# Patient Record
Sex: MALE | Race: WHITE | Employment: FULL TIME | URBAN - METROPOLITAN AREA
[De-identification: names, ages, dates, MRNs, and addresses within clinical notes are randomized per-mention and may not be internally consistent; named-entity substitution may affect disease eponyms.]

---

## 2017-03-29 ENCOUNTER — HOSPITAL ENCOUNTER (OUTPATIENT)
Dept: PHYSICAL THERAPY | Age: 67
Discharge: HOME | End: 2017-03-29
Payer: COMMERCIAL

## 2017-03-29 PROCEDURE — 97110 THERAPEUTIC EXERCISES: CPT

## 2017-03-29 PROCEDURE — 97162 PT EVAL MOD COMPLEX 30 MIN: CPT

## 2017-03-29 PROCEDURE — 97140 MANUAL THERAPY 1/> REGIONS: CPT

## 2017-03-29 NOTE — PROGRESS NOTES
[facility] PHYSICAL THERAPY  [address] [place] [address] 201,[facility], 70 [facility] - Phone: [phone]  Fax: [phone] / [phone] [facility]  Patient Name: Chela Sánchez : 1950   Treatment   Diagnosis: (B) TKR Medical   Diagnosis: Bilateral knee pain [M25.561, M25.562]   Onset Date: 17     Referral Source: DANNY [facility]hen[facility]): 3/29/2017   Prior Hospitalization: See medical history Provider #: [id number]   Prior Level of Function: Limited squatting, stooping, walking due to (B) knee OA   Comorbidities: Hx of (R) pelvic fracture, (B) TKR   Medications: Verified on Patient Summary List   The Plan of Care and following information is based on the information from the initial evaluation.   ==================================================================================  Assessment / key information: Pt is a 62 yo male s/p (B) TKR 17 . He presents with pain ranging from 0-6/10, located (B) knee (R) > (L). Pain is made worse with activity, prolonged walking, knee flexion, better with rest, heat. Palpation reveals TTP increase to (R) suprapatellar region, (R) medial joint line. Gait: antalgic with decreased WBing on (R). Knee (R)/(L) AROM/PROM: flexion 95 deg/100, extension 0 deg. Ankle AROM DF: 0 deg. LE MMT: hip flex 4/5, abd 3/5, add 4/5, ext 3/5, knee flex 4/5, ext 4/5, ankle 4/5. Sensation is hypersensitivity to (R) incision to light touch in the LE. Patellar mobility is decrease medial and inferior patellar glides on (R). HS 90/90 20 deg. Pt is limited in the following activities: limited prolonged ambulation, stair climbing, stooping, performing work related duties. Pt will benefit from PT interventions to address the aforementioned deficits and allow pt to return to PLOF.    Eval Complexity: History MEDIUM  Complexity : 1-2 comorbidities / personal factors will impact the outcome/ POC ;  Examination  MEDIUM Complexity : 3 Standardized tests and measures addressing body structure, function, activity limitation and / or participation in recreation ; Presentation MEDIUM Complexity : Evolving with changing characteristics ; Decision Making MEDIUM Complexity : FOTO score of 26-74; Overall Complexity MEDIUM   ==================================================================================  Problem List: pain affecting function, decrease ROM, decrease strength, decrease ADL/ functional abilitiies, decrease activity tolerance, decrease flexibility/ joint mobility and decrease transfer abilities   Treatment Plan may include any combination of the following: Therapeutic exercise, Therapeutic activities, Neuromuscular re-education, Physical agent/modality, Gait/balance training, Manual therapy and Patient education  Patient / Family readiness to learn indicated by: asking questions, trying to perform skills and interest  Persons(s) to be included in education: patient (P)  Barriers to Learning/Limitations: no  Measures taken:    Patient Goal (s): Decrease pain and increase function   Patient self reported health status: good  Rehabilitation Potential: good   Short Term Goals: To be accomplished in  2  weeks:  1. Pt will be independent and compliant with HEP to decrease pain, increase ROM and return pt to PLOF. 2. Pt will note pain less than or equal to 5/10 at worst to allow increase in functional abilities. 3. Pt demonstrates increase in (B) knee flexion to > 110 deg to allow increase in functional activity tolerance    Long Term Goals: To be accomplished in  4  weeks:  1. Increase score on FOTO by > or = to 14 points to demo an increase in functional activity tolerance with the LE. 2. Pt will note < or = 2/10 pain with all mobility to improve comfort with ADLs.    3. Pt will demonstrates GROC >/= 4+ to allow increase in functional activity tolerance   Frequency / Duration: Patient to be seen  2-3  times per week for 4  weeks:  Patient / Caregiver education and instruction: self care, activity modification and exercises  G-Codes (GP): edison  Therapist Signature: Kathie Mariee DPT, CIMT Date: 0/32/6556   Certification Period: na Time: 4:27 PM   ===========================================================================================  I certify that the above Physical Therapy Services are being furnished while the patient is under my care. I agree with the treatment plan and certify that this therapy is necessary. Physician Signature:        Date:       Time:     Please sign and return to [facility] or you may fax the signed copy to [phone]. Thank you.

## 2017-03-29 NOTE — PROGRESS NOTES
PHYSICAL THERAPY - DAILY TREATMENT NOTE    Patient Name: Robert Navarro        Date: 3/29/2017  : 1950   YES Patient  Verified  Visit #:      12  Insurance: Payor: /      In time: 400 Out time: 430   Total Treatment Time: 30     TREATMENT AREA =  (B) TKR    SUBJECTIVE  Pain Level (on 0 to 10 scale):  ie  / 10   Medication Changes/New allergies or changes in medical history, any new surgeries or procedures? NO    If yes, update Summary List   Subjective Functional Status/Changes:  []  No changes reported     See POC          OBJECTIVE  Modality rationale:      min [] Estim, type:                                          []  att     []  unatt     []  w/US     []  w/ice    []  w/heat    min []  Mechanical Traction: type/lbs                                               []  pro   []  sup   []  int   []  cont    min []  Ultrasound, settings/location:      min []  Iontophoresis:  []  take home patch w/ dexamethazone    min                                []  in clinic w/ dexamethazone    min []  Ice     []  Heat     position:     min []  Other:      8 min Manual Therapy: Inferior glide to (R) patella, posterior glide for (R) knee flexion       min Therapeutic Exercise:  [x]  See flow sheet   []  Other:      []  Added:     to improve (function):    []  Changed:     to improve (function):       min Patient Education:  YES  Reviewed HEP   []  Progressed/Changed HEP based on:         Other Objective/Functional Measures:    See POC     Post Treatment Pain Level (on 0 to 10) scale:   ie  / 10     ASSESSMENT  []  See Progress Note/Recertification   Patient will continue to benefit from skilled therapy to address remaining functional deficits: Decrease amb tolerance, work related postures    Progress toward goals / Updated goals:    See POC     PLAN  []  Upgrade activities as tolerated YES Continue plan of care   []  Discharge due to :    []  Other:      Therapist: Aida Wells DPT, CIMT    Date: 3/29/2017 Time: 4:25 PM

## 2017-03-30 ENCOUNTER — HOSPITAL ENCOUNTER (OUTPATIENT)
Dept: PHYSICAL THERAPY | Age: 67
Discharge: HOME | End: 2017-03-30
Payer: COMMERCIAL

## 2017-03-30 PROCEDURE — 97112 NEUROMUSCULAR REEDUCATION: CPT

## 2017-03-30 PROCEDURE — 97140 MANUAL THERAPY 1/> REGIONS: CPT

## 2017-03-30 PROCEDURE — 97110 THERAPEUTIC EXERCISES: CPT

## 2017-03-30 NOTE — PROGRESS NOTES
PHYSICAL THERAPY - DAILY TREATMENT NOTE    Patient Name: Flavio Oliveros        Date: 3/30/2017  : 1950   YES Patient  Verified  Visit #:     Insurance: Payor: Adi Rainey / Plan: [place] [place] PPO / Product Type: PPO /      In time: 800 Out time: 900   Total Treatment Time: 60       TREATMENT AREA = Bilateral knee pain [M25.561, M25.562]    SUBJECTIVE  Pain Level (on 0 to 10 scale):  3  / 10   Medication Changes/New allergies or changes in medical history, any new surgeries or procedures?     NO    If yes, update Summary List   Subjective Functional Status/Changes:  []  No changes reported     Reports soreness anterior aspect of (R) knee at onset of visit today          OBJECTIVE  Modalities Rationale:     decrease inflammation and decrease pain to improve patient's ability to perform ADLs   min [] Estim, type/location:                                      []  att     []  unatt     []  w/US     []  w/ice    []  w/heat    min []  Mechanical Traction: type/lbs                   []  pro   []  sup   []  int   []  cont    []  before manual    []  after manual    min []  Ultrasound, settings/location:      min []  Iontophoresis w/ dexamethasone, location:                                               []  take home patch       []  in clinic   10 min [x]  Ice     []  Heat    location/position: (B) knee    min []  Vasopneumatic Device, press/temp:     min []  Other:    [x] Skin assessment post-treatment (if applicable):    [x]  intact    []  redness- no adverse reaction     []redness  adverse reaction:        32 min Therapeutic Exercise:  [x]  See flow sheet   Rationale:      increase ROM and increase strength to improve the patients ability to perform ADLs     10 min Manual Therapy: DTM to (B) suprapatellar region, pat mobs, TF mobs, PROM of knee    Rationale:      decrease pain, increase ROM and increase tissue extensibility to improve patient's ability to perform ADLs    8 min Neuromuscular Re-ed: SL balance, sumanth   Rationale:    improve coordination, improve balance and increase proprioception to improve the patients ability to increase amb tolerance      min Gait Training:    Rationale:       min Patient Education:  YES  Reviewed HEP   []  Progressed/Changed HEP based on: Other Objective/Functional Measures:  Initiated therx per flow sheet, con't with limited knee ROM of (B) LEs, increases soreness with palpation of suprapatellar region, decreased posterior glide of tibia     Post Treatment Pain Level (on 0 to 10) scale:   2  / 10     ASSESSMENT  Assessment/Changes in Function:     Demonstrates good tolerance to initial therx without increase in s/s     []  See Progress Note/Recertification   Patient will continue to benefit from skilled PT services to modify and progress therapeutic interventions, address functional mobility deficits, address ROM deficits, address strength deficits and analyze and address soft tissue restrictions to attain remaining goals.    Progress toward goals / Updated goals:  No changes towards goals, will monitor and progress as able      PLAN  []  Upgrade activities as tolerated YES Continue plan of care   []  Discharge due to :    []  Other:      Therapist: Álvaro Byrnes DPT, CIMT    Date: 3/30/2017 Time: 8:13 AM       Future Appointments  Date Time Provider Hansa Brooks   4/5/2017 8:00 AM Ave Wade, PT [facility]   4/7/2017 6:30 AM Mary Kay Parada, PT [facility]   4/10/2017 7:30 AM Mary Kay Parada, PT [facility]   4/12/2017 6:30 AM Mary Kay Parada, PT [facility]   4/14/2017 8:00 AM Stanislaw Astudillo, PT [facility]   4/17/2017 8:00 AM Ave Wade, PT [address] [place] [address]   4/19/2017 8:00 AM Ave Wade, PT [address] [place] [address]   4/21/2017 8:00 AM Stanislaw Astudillo, PT [facility]   4/24/2017 8:00 AM Ave Wade, PT [facility]   4/26/2017 8:00 AM Ave Wade, PT [facility]   4/28/2017 8:00 AM Stanislaw Astudillo, PT [facility]

## 2017-04-05 ENCOUNTER — HOSPITAL ENCOUNTER (OUTPATIENT)
Dept: PHYSICAL THERAPY | Age: 67
Discharge: HOME | End: 2017-04-05
Payer: COMMERCIAL

## 2017-04-05 PROCEDURE — 97140 MANUAL THERAPY 1/> REGIONS: CPT | Performed by: PHYSICAL THERAPIST

## 2017-04-05 PROCEDURE — 97110 THERAPEUTIC EXERCISES: CPT | Performed by: PHYSICAL THERAPIST

## 2017-04-05 NOTE — PROGRESS NOTES
[facility]   PHYSICAL THERAPY - DAILY TREATMENT NOTE    Patient Name: Kael Daigle        Date: 2017  : 1950   yes Patient  Verified  Visit #:   3   of   12  Insurance: Payor: Darcie Heredia / Plan: Nava Cole PPO / Product Type: PPO /      In time: 8:00 Out time: 8:55   Total Treatment Time: 55     Medicare Time Tracking (below)   Total Timed Codes (min):  na 1:1 Treatment Time:  na     TREATMENT AREA =  Bilateral knee pain [M25.561, M25.562]    SUBJECTIVE  Pain Level (on 0 to 10 scale):  0  / 10   Medication Changes/New allergies or changes in medical history, any new surgeries or procedures?    no  If yes, update Summary List   Subjective Functional Status/Changes:  []  No changes reported     i am sore in the top part of my legs where the muscle is but no pain           OBJECTIVE  Modalities Rationale:     decrease inflammation, decrease pain and increase tissue extensibility to improve patient's ability to stand/walk prolonged periods   min [] Estim, type/location:                                      []  att     []  unatt     []  w/US     []  w/ice    []  w/heat    min []  Mechanical Traction: type/lbs                   []  pro   []  sup   []  int   []  cont    []  before manual    []  after manual    min []  Ultrasound, settings/location:      min []  Iontophoresis w/ dexamethasone, location:                                               []  take home patch       []  in clinic   10 min [x]  Ice     []  Heat    location/position: Supine with bolster    min []  Vasopneumatic Device, press/temp:     min []  Other:    [x] Skin assessment post-treatment (if applicable):    [x]  intact    []  redness- no adverse reaction     []redness  adverse reaction:        25 min Therapeutic Exercise:  [x]  See flow sheet   Rationale:      increase ROM, increase strength, improve balance and increase proprioception to improve the patients ability to stand/walk prolonged periods     20 min Manual Therapy: B Dtm/stretch to quad, pat sup/inf mobs, knee prom   Rationale:      decrease pain, increase ROM, increase tissue extensibility and decrease trigger points to improve patient's ability to stand/walk prolonged periods         min Patient Education:  yes  Reviewed HEP   [x]  Progressed/Changed HEP based on:  Advised to perform stm with roller on quads 2-3' daily      Other Objective/Functional Measures:    Performed therapy as per flow sheet followed by MT in both supine and prone     Post Treatment Pain Level (on 0 to 10) scale:   0  / 10     ASSESSMENT  Assessment/Changes in Function:     Continues with limited R knee flexion with palpable trp and decreased extensibility of quad. []  See Progress Note/Recertification   Patient will continue to benefit from skilled PT services to modify and progress therapeutic interventions, address functional mobility deficits, address ROM deficits, address strength deficits, analyze and address soft tissue restrictions, analyze and cue movement patterns, analyze and modify body mechanics/ergonomics, assess and modify postural abnormalities, address imbalance/dizziness and instruct in home and community integration to attain remaining goals.    Progress toward goals / Updated goals:  Stg #3 partially achieved - R arom still <110     PLAN  []  Upgrade activities as tolerated yes Continue plan of care   []  Discharge due to :    []  Other:      Therapist: Zoraida Stout, PT    Date: 4/5/2017 Time: 8:46 AM     Future Appointments  Date Time Provider Hansa Brooks   4/7/2017 6:30 AM Cori Burgess, PT [facility]   4/10/2017 7:30 AM Cori Burgess, PT [facility]   4/12/2017 6:30 AM Cori Burgess, PT [facility]   4/14/2017 8:00 AM Shaunna Goncalves, PT [facility]   4/17/2017 8:00 AM Zoraida Stout, PT [facility]   4/19/2017 8:00 AM Zoraida Stout, PT [facility]   4/21/2017 8:00 AM Shaunna Goncalves, PT [facility]   4/24/2017 8:00 AM Zoraida Stout, PT [facility]   4/26/2017 8:00 AM Zoraida Stout, PT [facility]   4/28/2017 8:00 AM GARY Mesa [facility]

## 2017-04-07 ENCOUNTER — HOSPITAL ENCOUNTER (OUTPATIENT)
Dept: PHYSICAL THERAPY | Age: 67
Discharge: HOME | End: 2017-04-07
Payer: COMMERCIAL

## 2017-04-07 PROCEDURE — 97110 THERAPEUTIC EXERCISES: CPT

## 2017-04-07 PROCEDURE — 97140 MANUAL THERAPY 1/> REGIONS: CPT

## 2017-04-07 NOTE — PROGRESS NOTES
PHYSICAL THERAPY - DAILY TREATMENT NOTE    Patient Name: Josh De Oliveira        Date: 2017  : 1950   YES Patient  Verified  Visit #:      of   12  Insurance: Payor: Prashant Angel / Plan: Yoav Jeter PPO / Product Type: PPO /      In time: 6:30 Out time: 7:35   Total Treatment Time: 65     Medicare Time Tracking (below)   Total Timed Codes (min):  na 1:1 Treatment Time:  na     TREATMENT AREA =  Bilateral knee pain [M25.561, M25.562]    SUBJECTIVE  Pain Level (on 0 to 10 scale):  0  / 10   Medication Changes/New allergies or changes in medical history, any new surgeries or procedures?     NO    If yes, update Summary List   Subjective Functional Status/Changes:  []  No changes reported     Im a little sore from the exercises, but no pain at th knee          OBJECTIVE  Modalities Rationale:     decrease inflammation and decrease pain to improve patient's ability to amb   min [] Estim, type/location:                                      []  att     []  unatt     []  w/US     []  w/ice    []  w/heat    min []  Mechanical Traction: type/lbs                   []  pro   []  sup   []  int   []  cont    []  before manual    []  after manual    min []  Ultrasound, settings/location:      min []  Iontophoresis w/ dexamethasone, location:                                               []  take home patch       []  in clinic   10 min [x]  Ice     []  Heat    location/position:     min []  Vasopneumatic Device, press/temp:     min []  Other:    [x] Skin assessment post-treatment (if applicable):    [x]  intact    []  redness- no adverse reaction     []redness  adverse reaction:        45 min Therapeutic Exercise:  [x]  See flow sheet   Rationale:      increase ROM, increase strength and improve coordination to improve the patients ability to amb     10 min Manual Therapy: DTM quad, HS, popliteus, TF/pat mob, stretch   Rationale:      decrease pain, increase ROM and increase tissue extensibility to improve patient's ability to amb       min Patient Education:  YES  Reviewed HEP   []  Progressed/Changed HEP based on: Other Objective/Functional Measures:    Sig TTP at BF on R     Post Treatment Pain Level (on 0 to 10) scale:   0  / 10     ASSESSMENT  Assessment/Changes in Function:     Good shannan to all Rx without increase in pain      []  See Progress Note/Recertification   Patient will continue to benefit from skilled PT services to modify and progress therapeutic interventions, address functional mobility deficits, address ROM deficits, address strength deficits, analyze and address soft tissue restrictions, analyze and cue movement patterns, analyze and modify body mechanics/ergonomics and assess and modify postural abnormalities to attain remaining goals.    Progress toward goals / Updated goals:    Slow progress with ROM      PLAN  []  Upgrade activities as tolerated YES Continue plan of care   []  Discharge due to :    []  Other:      Therapist: Toi Trevino, PT, OCS, SCS, CSCS    Date: 4/7/2017 Time: 6:35 AM       Future Appointments  Date Time Provider Hansa Brooks   4/10/2017 7:30 AM Namita Fernandez, PT [facility]   4/12/2017 6:30 AM Namita Fernandez, PT [facility]   4/14/2017 8:00 AM Guero Gooden, PT [facility]   4/17/2017 8:00 AM Chelsey Evans, PT [facility]   4/19/2017 8:00 AM Chelsey Evans, PT [facility]   4/21/2017 8:00 AM Guero Gooden PT [facility]   4/24/2017 8:00 AM Chelsey Evans PT [facility]   4/26/2017 8:00 AM Chelsey Evans, PT [facility]   4/28/2017 8:00 AM Guero Gooden, PT [facility]

## 2017-04-10 ENCOUNTER — HOSPITAL ENCOUNTER (OUTPATIENT)
Dept: PHYSICAL THERAPY | Age: 67
Discharge: HOME | End: 2017-04-10
Payer: COMMERCIAL

## 2017-04-10 PROCEDURE — 97140 MANUAL THERAPY 1/> REGIONS: CPT

## 2017-04-10 PROCEDURE — 97110 THERAPEUTIC EXERCISES: CPT

## 2017-04-10 NOTE — PROGRESS NOTES
PHYSICAL THERAPY - DAILY TREATMENT NOTE    Patient Name: Neno Velazquez        Date: 4/10/2017  : 1950   YES Patient  Verified  Visit #:      of   12  Insurance: Payor: Socorro Persons / Plan: [place] PPO / Product Type: PPO /      In time: 7:35 Out time: 8:30   Total Treatment Time: 55     Medicare Time Tracking (below)   Total Timed Codes (min):  na 1:1 Treatment Time:  na     TREATMENT AREA =  Bilateral knee pain [M25.561, M25.562]    SUBJECTIVE  Pain Level (on 0 to 10 scale):  0  / 10   Medication Changes/New allergies or changes in medical history, any new surgeries or procedures? NO    If yes, update Summary List   Subjective Functional Status/Changes:  []  No changes reported     No new c/o          OBJECTIVE     40 min Therapeutic Exercise: [x] See flow sheet   Rationale: increase ROM, increase strength and improve coordination to improve the patients ability to amb      15 min Manual Therapy: DTM quad, HS, popliteus, TF/pat mob, stretch   Rationale: decrease pain, increase ROM and increase tissue extensibility to improve patient's ability to amb  Rationale:       min Patient Education:  YES  Reviewed HEP   []  Progressed/Changed HEP based on: Other Objective/Functional Measures:    Cont to have sig TTP at R HS     Post Treatment Pain Level (on 0 to 10) scale:   0  / 10     ASSESSMENT  Assessment/Changes in Function:     Good shannan to all Rx without increase in pain      []  See Progress Note/Recertification   Patient will continue to benefit from skilled PT services to modify and progress therapeutic interventions, address functional mobility deficits, address ROM deficits, address strength deficits, analyze and address soft tissue restrictions, analyze and cue movement patterns, analyze and modify body mechanics/ergonomics and assess and modify postural abnormalities to attain remaining goals.    Progress toward goals / Updated goals:    Slow progress with ROM      PLAN  []  Upgrade activities as tolerated YES Continue plan of care   []  Discharge due to :    []  Other:      Therapist: Maulik Mayorga, PT, OCS, SCS, CSCS    Date: 4/10/2017 Time: 7:47 AM       Future Appointments  Date Time Provider Hansa Brooks   4/12/2017 6:30 AM Mary Kay Parada, PT [facility]   4/14/2017 8:00 AM Stanislaw Astudillo, PT [facility]   4/17/2017 8:00 AM Monica Olvera, PT [facility]   4/19/2017 8:00 AM Monica Olvera, PT [facility]   4/21/2017 8:00 AM Stanislaw Astudillo, PT [facility]   4/24/2017 8:00 AM Monica lOvera, PT [facility]   4/26/2017 8:00 AM Monica Olvera, PT [facility]   4/28/2017 8:00 AM Stanislaw Astudillo, PT [facility]

## 2017-04-12 ENCOUNTER — HOSPITAL ENCOUNTER (OUTPATIENT)
Dept: PHYSICAL THERAPY | Age: 67
Discharge: HOME | End: 2017-04-12
Payer: COMMERCIAL

## 2017-04-12 PROCEDURE — 97110 THERAPEUTIC EXERCISES: CPT

## 2017-04-12 PROCEDURE — 97140 MANUAL THERAPY 1/> REGIONS: CPT

## 2017-04-14 ENCOUNTER — HOSPITAL ENCOUNTER (OUTPATIENT)
Dept: PHYSICAL THERAPY | Age: 67
Discharge: HOME | End: 2017-04-14
Payer: COMMERCIAL

## 2017-04-14 PROCEDURE — 97140 MANUAL THERAPY 1/> REGIONS: CPT

## 2017-04-14 PROCEDURE — 97110 THERAPEUTIC EXERCISES: CPT

## 2017-04-14 NOTE — PROGRESS NOTES
PHYSICAL THERAPY - DAILY TREATMENT NOTE    Patient Name: Kwan Avina        Date: 2017  : 1950   YES Patient  Verified  Visit #:     Insurance: Payor: Hanny Pearce / Plan: Star Satnos PPO / Product Type: PPO /      In time: 800 Out time: 900   Total Treatment Time: 60       TREATMENT AREA = Bilateral knee pain [M25.561, M25.562]    SUBJECTIVE  Pain Level (on 0 to 10 scale):  1  / 10   Medication Changes/New allergies or changes in medical history, any new surgeries or procedures?     NO    If yes, update Summary List   Subjective Functional Status/Changes:  []  No changes reported     Reports soreness (R) > (L) knee with limited function          OBJECTIVE  Modalities Rationale:     decrease pain and increase tissue extensibility to improve patient's ability to perform ADLs   min [] Estim, type/location:                                      []  att     []  unatt     []  w/US     []  w/ice    []  w/heat    min []  Mechanical Traction: type/lbs                   []  pro   []  sup   []  int   []  cont    []  before manual    []  after manual    min []  Ultrasound, settings/location:      min []  Iontophoresis w/ dexamethasone, location:                                               []  take home patch       []  in clinic   10 min [x]  Ice     []  Heat    location/position: (B) knee     min []  Vasopneumatic Device, press/temp:     min []  Other:    [x] Skin assessment post-treatment (if applicable):    [x]  intact    []  redness- no adverse reaction     []redness  adverse reaction:        40 min Therapeutic Exercise:  [x]  See flow sheet   Rationale:      increase ROM and increase strength to improve the patients ability to perform ADLs     10 Min Manual Therapy: DTM to (B) suprapatellar region, inferior pat mobs, TF mobs for knee flexion, PROM of (R) knee    Rationale:      decrease pain, increase ROM, increase tissue extensibility and decrease edema  to improve patient's ability to perform ADLs min Patient Education:  YES  Reviewed HEP   []  Progressed/Changed HEP based on: Other Objective/Functional Measures:    Cont with edema in suprapatellar region with AROM flexion limited to 115 deg on (L), 97 deg on (R)      Post Treatment Pain Level (on 0 to 10) scale:   0  / 10     ASSESSMENT  Assessment/Changes in Function:     Progressing slowly with overall mobility of (B) extremities      []  See Progress Note/Recertification   Patient will continue to benefit from skilled PT services to modify and progress therapeutic interventions, address functional mobility deficits, address ROM deficits, address strength deficits and analyze and address soft tissue restrictions to attain remaining goals. Progress toward goals / Updated goals:     Will monitor and progress as able      PLAN  []  Upgrade activities as tolerated YES Continue plan of care   []  Discharge due to :    []  Other:      Therapist: Rickey Richard DPT, [facility]    Date: 4/14/2017 Time: 9:07 AM       Future Appointments  Date Time Provider Hansa Brooks   4/17/2017 8:00 AM Cassandra Petty, PT [address] [place] [address]   4/19/2017 8:00 AM Cassandra Petty, PT [address] [place] [address]   4/21/2017 8:00 AM Alycia Tilley, PT [facility]   4/24/2017 8:00 AM Cassandra Petty, PT [facility]   4/26/2017 8:00 AM Cassandra Petty, PT [facility]   4/28/2017 8:00 AM Alycia Tilley, PT [address] [place] [address]

## 2017-04-17 ENCOUNTER — HOSPITAL ENCOUNTER (OUTPATIENT)
Dept: PHYSICAL THERAPY | Age: 67
Discharge: HOME | End: 2017-04-17
Payer: COMMERCIAL

## 2017-04-17 PROCEDURE — 97140 MANUAL THERAPY 1/> REGIONS: CPT | Performed by: PHYSICAL THERAPIST

## 2017-04-17 PROCEDURE — 97110 THERAPEUTIC EXERCISES: CPT | Performed by: PHYSICAL THERAPIST

## 2017-04-17 NOTE — PROGRESS NOTES
[facility] PHYSICAL THERAPY - DAILY TREATMENT NOTE    Patient Name: Oanh Reyes        Date: 2017  : 1950   yes Patient  Verified  Visit #:     Insurance: Payor: Sofia Merida / Plan: Nancy Byrnes PPO / Product Type: PPO /      In time: 8:05 Out time: 9:15   Total Treatment Time: 65     Medicare Time Tracking (below)   Total Timed Codes (min):  na 1:1 Treatment Time:  na     TREATMENT AREA =  Bilateral knee pain [M25.561, M25.562]    SUBJECTIVE  Pain Level (on 0 to 10 scale):  0  / 10   Medication Changes/New allergies or changes in medical history, any new surgeries or procedures?    no  If yes, update Summary List   Subjective Functional Status/Changes:  []  No changes reported     i went camping over the weekend and i walked around a lot on [place] and my knees actually held up okay.           OBJECTIVE  Modalities Rationale:     decrease inflammation, decrease pain and increase tissue extensibility to improve patient's ability to perform adls   min [] Estim, type/location:                                      []  att     []  unatt     []  w/US     []  w/ice    []  w/heat    min []  Mechanical Traction: type/lbs                   []  pro   []  sup   []  int   []  cont    []  before manual    []  after manual    min []  Ultrasound, settings/location:      min []  Iontophoresis w/ dexamethasone, location:                                               []  take home patch       []  in clinic   10 min [x]  Ice     []  Heat    location/position: Supine with bolster    min []  Vasopneumatic Device, press/temp:     min []  Other:    [x] Skin assessment post-treatment (if applicable):    [x]  intact    []  redness- no adverse reaction     []redness  adverse reaction:        35 min Therapeutic Exercise:  [x]  See flow sheet   Rationale:      increase ROM, increase strength, improve coordination, improve balance and increase proprioception to improve the patients ability to complete adls     20 min Manual Therapy: B knee prom, pat mobs all directions, hs stretch and dtm   Rationale:      decrease pain, increase ROM, increase tissue extensibility and decrease trigger points to improve patient's ability to stand/walk prolonged periods     min Patient Education:  yes  Reviewed HEP   []  Progressed/Changed HEP based on: Other Objective/Functional Measures:    Despite increase in activity over the weekend arrived to clinic with no increase in edema but ttp and decreased restriction of R hs  Completed te as per flow sheet with constant cues required for mini-band te to avoid hip compensation      Post Treatment Pain Level (on 0 to 10) scale:   2  / 10     ASSESSMENT  Assessment/Changes in Function:     Continues with R sided weakness and lack of tke promoting antalgic gait      []  See Progress Note/Recertification   Patient will continue to benefit from skilled PT services to modify and progress therapeutic interventions, address functional mobility deficits, address ROM deficits, address strength deficits, analyze and address soft tissue restrictions, analyze and cue movement patterns, analyze and modify body mechanics/ergonomics, assess and modify postural abnormalities and instruct in home and community integration to attain remaining goals.    Progress toward goals / Updated goals:    Continues to make steady gains towards consistent pain reduction      PLAN  []  Upgrade activities as tolerated yes Continue plan of care   []  Discharge due to :    []  Other:      Therapist: Chantal Meza PT    Date: 4/17/2017 Time: 9:57 AM     Future Appointments  Date Time Provider Hansa Brooks   4/19/2017 8:00 AM Chantal Meza PT [address] [place] [address]   4/21/2017 8:00 AM Annette Sarmiento PT [facility]   4/24/2017 8:00 AM Chantal Meza PT [address] [place] [address]   4/26/2017 8:00 AM Chantal Meza PT [facility]   4/28/2017 8:00 AM Annette Sarmiento PT [facility]

## 2017-04-19 ENCOUNTER — HOSPITAL ENCOUNTER (OUTPATIENT)
Dept: PHYSICAL THERAPY | Age: 67
Discharge: HOME | End: 2017-04-19
Payer: COMMERCIAL

## 2017-04-19 PROCEDURE — 97110 THERAPEUTIC EXERCISES: CPT | Performed by: PHYSICAL THERAPIST

## 2017-04-19 PROCEDURE — 97140 MANUAL THERAPY 1/> REGIONS: CPT | Performed by: PHYSICAL THERAPIST

## 2017-04-19 NOTE — PROGRESS NOTES
[facility] PHYSICAL THERAPY - DAILY TREATMENT NOTE    Patient Name: Kael Daigle        Date: 2017  : 1950   yes Patient  Verified  Visit #:     Insurance: Payor: Darcie Heredia / Plan: Nava Cole PPO / Product Type: PPO /      In time: 8:00 Out time: 9:05   Total Treatment Time: 62     Medicare Time Tracking (below)   Total Timed Codes (min):  na 1:1 Treatment Time:  na     TREATMENT AREA =  Bilateral knee pain [M25.561, M25.562]    SUBJECTIVE  Pain Level (on 0 to 10 scale):  0  / 10   Medication Changes/New allergies or changes in medical history, any new surgeries or procedures?    no  If yes, update Summary List   Subjective Functional Status/Changes:  []  No changes reported     i was a little sore after last time but a good muscle sore. im pleased with the progress i have made so far.         OBJECTIVE  Modalities Rationale:     decrease inflammation, decrease pain and increase tissue extensibility to improve patient's ability to perform adls   min [] Estim, type/location:                                      []  att     []  unatt     []  w/US     []  w/ice    []  w/heat    min []  Mechanical Traction: type/lbs                   []  pro   []  sup   []  int   []  cont    []  before manual    []  after manual    min []  Ultrasound, settings/location:      min []  Iontophoresis w/ dexamethasone, location:                                               []  take home patch       []  in clinic   10 min [x]  Ice     []  Heat    location/position: Supine with bolster    min []  Vasopneumatic Device, press/temp:     min []  Other:    [x] Skin assessment post-treatment (if applicable):    [x]  intact    []  redness- no adverse reaction     []redness  adverse reaction:        35 min Therapeutic Exercise:  [x]  See flow sheet   Rationale:      increase ROM, increase strength, improve coordination, improve balance and increase proprioception to improve the patients ability to complete adls     17 min Manual Therapy: B knee prom, pat mobs all directions, hs stretch and dtm R    Rationale:      decrease pain, increase ROM, increase tissue extensibility and decrease trigger points to improve patient's ability to stand/walk prolonged periods     min Patient Education:  yes  Reviewed HEP   []  Progressed/Changed HEP based on: Other Objective/Functional Measures:    arom/prom R 0-103/111 with anterior \"tightness\" reported at end range flexion  Difficulty negotiating reciprocal hurdles on R without hip hike (even with cues)  Continues with palpable soft tissue restrictions along quad   Post Treatment Pain Level (on 0 to 10) scale:   2  / 10     ASSESSMENT  Assessment/Changes in Function:     Continues with R sided weakness and lack of tke during gait      []  See Progress Note/Recertification   Patient will continue to benefit from skilled PT services to modify and progress therapeutic interventions, address functional mobility deficits, address ROM deficits, address strength deficits, analyze and address soft tissue restrictions, analyze and cue movement patterns, analyze and modify body mechanics/ergonomics, assess and modify postural abnormalities and instruct in home and community integration to attain remaining goals.    Progress toward goals / Updated goals:    Slow progress towards stg #3     PLAN  []  Upgrade activities as tolerated yes Continue plan of care   []  Discharge due to :    []  Other:      Therapist: Joselyn Toribio, PT    Date: 4/19/2017 Time: 9:57 AM     Future Appointments  Date Time Provider Hansa Brooks   4/21/2017 8:00 AM Gilford Goring, PT [facility]   4/24/2017 8:00 AM Joselyn Toribio PT [facility]   4/26/2017 8:00 AM Joselyn Toribio PT [facility]   4/28/2017 8:00 AM Gilford Goring, PT [facility]

## 2017-04-21 ENCOUNTER — HOSPITAL ENCOUNTER (OUTPATIENT)
Dept: PHYSICAL THERAPY | Age: 67
Discharge: HOME | End: 2017-04-21
Payer: COMMERCIAL

## 2017-04-21 PROCEDURE — 97140 MANUAL THERAPY 1/> REGIONS: CPT

## 2017-04-21 PROCEDURE — 97110 THERAPEUTIC EXERCISES: CPT

## 2017-04-21 NOTE — PROGRESS NOTES
PHYSICAL THERAPY - DAILY TREATMENT NOTE    Patient Name: Vanessa Cruz        Date: 2017  : 1950   YES Patient  Verified  Visit #:   10   of   12  Insurance: Payor: Cam Ma / Plan: Jesus Jones PPO / Product Type: PPO /      In time: 800 Out time: 845   Total Treatment Time: 45       TREATMENT AREA = Bilateral knee pain [M25.561, M25.562]    SUBJECTIVE  Pain Level (on 0 to 10 scale):  0  / 10   Medication Changes/New allergies or changes in medical history, any new surgeries or procedures?     NO    If yes, update Summary List   Subjective Functional Status/Changes:  []  No changes reported     Reports con't tightness (R) knee with flexion based activities           OBJECTIVE  Modalities Rationale:     decrease inflammation and decrease pain to improve patient's ability to perform ADLs   min [] Estim, type/location:                                      []  att     []  unatt     []  w/US     []  w/ice    []  w/heat    min []  Mechanical Traction: type/lbs                   []  pro   []  sup   []  int   []  cont    []  before manual    []  after manual    min []  Ultrasound, settings/location:      min []  Iontophoresis w/ dexamethasone, location:                                               []  take home patch       []  in clinic   10 min [x]  Ice     []  Heat    location/position:     min []  Vasopneumatic Device, press/temp:     min []  Other:    [x] Skin assessment post-treatment (if applicable):    [x]  intact    []  redness- no adverse reaction     []redness  adverse reaction:        25 min Therapeutic Exercise:  [x]  See flow sheet   Rationale:      increase ROM and increase strength to improve the patients ability to perform ADLs     10 Min Manual Therapy: Pat mobs, DTM to (R) rectus femoris, PROM of (R) knee    Rationale:      decrease pain, increase ROM and increase tissue extensibility to improve patient's ability to perform ADLs     min Patient Education:  YES  Reviewed HEP   [] Progressed/Changed HEP based on: Other Objective/Functional Measures:    Demonstrates increase mm hypertonicity in (R) rectus femoris, with limited knee flexion    Post Treatment Pain Level (on 0 to 10) scale:  0   / 10     ASSESSMENT  Assessment/Changes in Function:     Progressing with overall function and ADLs tolerance      []  See Progress Note/Recertification   Patient will continue to benefit from skilled PT services to modify and progress therapeutic interventions, address functional mobility deficits, address ROM deficits, address strength deficits and analyze and address soft tissue restrictions to attain remaining goals.    Progress toward goals / Updated goals:    Progressing with LTGs - see PN      PLAN  []  Upgrade activities as tolerated YES Continue plan of care   []  Discharge due to :    []  Other:      Therapist: Tad Morel DPT, [facility]    Date: 4/21/2017 Time: 8:03 AM       Future Appointments  Date Time Provider Hansa Brooks   4/24/2017 8:00 AM Kimberly Duarte, PT [facility]   4/26/2017 8:00 AM Kimberly Duarte PT [facility]   4/28/2017 8:00 AM Selina Hoover, PT [facility]

## 2017-04-24 ENCOUNTER — HOSPITAL ENCOUNTER (OUTPATIENT)
Dept: PHYSICAL THERAPY | Age: 67
Discharge: HOME | End: 2017-04-24
Payer: COMMERCIAL

## 2017-04-24 PROCEDURE — 97014 ELECTRIC STIMULATION THERAPY: CPT | Performed by: PHYSICAL THERAPIST

## 2017-04-24 PROCEDURE — 97110 THERAPEUTIC EXERCISES: CPT | Performed by: PHYSICAL THERAPIST

## 2017-04-24 PROCEDURE — 97140 MANUAL THERAPY 1/> REGIONS: CPT | Performed by: PHYSICAL THERAPIST

## 2017-04-24 NOTE — PROGRESS NOTES
[facility] PHYSICAL THERAPY - DAILY TREATMENT NOTE    Patient Name: Mayra Velez        Date: 2017  : 1950   yes Patient  Verified  Visit #:     Insurance: Payor: Esther Hawk / Plan: Ludin Reyes PPO / Product Type: PPO /      In time: 8:00 Out time: 9:00   Total Treatment Time: 60     Medicare Time Tracking (below)   Total Timed Codes (min):  na 1:1 Treatment Time:  na     TREATMENT AREA =  Bilateral knee pain [M25.561, M25.562]    SUBJECTIVE  Pain Level (on 0 to 10 scale):  3  / 10   Medication Changes/New allergies or changes in medical history, any new surgeries or procedures?    no  If yes, update Summary List   Subjective Functional Status/Changes:  []  No changes reported     i am in a little bit more discomfort today and i thinks its because i built a deck over the weekend. They did not swell up so i am guessing it is the muscles because i felt so weak doing that.       OBJECTIVE  Modalities Rationale:     decrease inflammation, decrease pain and increase tissue extensibility to improve patient's ability to perform adls   min [] Estim, type/location:                                      []  att     []  unatt     []  w/US     []  w/ice    []  w/heat    min []  Mechanical Traction: type/lbs                   []  pro   []  sup   []  int   []  cont    []  before manual    []  after manual    min []  Ultrasound, settings/location:      min []  Iontophoresis w/ dexamethasone, location:                                               []  take home patch       []  in clinic   10 min [x]  Ice     []  Heat    location/position: Supine with bolster    min []  Vasopneumatic Device, press/temp:     min []  Other:    [x] Skin assessment post-treatment (if applicable):    [x]  intact    []  redness- no adverse reaction     []redness  adverse reaction:        35 min Therapeutic Exercise:  [x]  See flow sheet   Rationale:      increase ROM, increase strength, improve coordination, improve balance and increase proprioception to improve the patients ability to complete adls     15 min Manual Therapy: B knee prom, pat mobs all directions, hs stretch and dtm B RF   Rationale:      decrease pain, increase ROM, increase tissue extensibility and decrease trigger points to improve patient's ability to stand/walk prolonged periods     min Patient Education:  yes  Reviewed HEP   []  Progressed/Changed HEP based on: Other Objective/Functional Measures:  No visible edema on either knee  Reported increase discomfort on R knee with end range flexion  Able to complete te as per flow sheet with reduction in overall s/s   Post Treatment Pain Level (on 0 to 10) scale:   1  / 10     ASSESSMENT  Assessment/Changes in Function:     Despite increase in activity levels max pain 3/10 without edema. Continues to have decreased R LE weakness/flexion limitation preventing normalized gait pattern      []  See Progress Note/Recertification   Patient will continue to benefit from skilled PT services to modify and progress therapeutic interventions, address functional mobility deficits, address ROM deficits, address strength deficits, analyze and address soft tissue restrictions, analyze and cue movement patterns, analyze and modify body mechanics/ergonomics, assess and modify postural abnormalities and instruct in home and community integration to attain remaining goals.    Progress toward goals / Updated goals:    Steady progress towards stg #2     PLAN  []  Upgrade activities as tolerated yes Continue plan of care   []  Discharge due to :    []  Other:      Therapist: Zuly Angela PT    Date: 4/24/2017 Time: 9:57 AM     Future Appointments  Date Time Provider Hansa Brooks   4/26/2017 8:00 AM Zuly Angela, PT [facility]   4/28/2017 8:00 AM Joe Lomeli, PT [facility]

## 2017-04-26 ENCOUNTER — HOSPITAL ENCOUNTER (OUTPATIENT)
Dept: PHYSICAL THERAPY | Age: 67
Discharge: HOME | End: 2017-04-26
Payer: COMMERCIAL

## 2017-04-26 PROCEDURE — 97110 THERAPEUTIC EXERCISES: CPT | Performed by: PHYSICAL THERAPIST

## 2017-04-26 PROCEDURE — 97140 MANUAL THERAPY 1/> REGIONS: CPT | Performed by: PHYSICAL THERAPIST

## 2017-04-26 NOTE — PROGRESS NOTES
[facility] PHYSICAL THERAPY - DAILY TREATMENT NOTE    Patient Name: Vince Cortes        Date: 2017  : 1950   yes Patient  Verified  Visit #:     Insurance: Payor: Alison Hugo / Plan: Baylee Ramirez PPO / Product Type: PPO /      In time: 8:00 Out time: 9:10   Total Treatment Time: 70     Medicare Time Tracking (below)   Total Timed Codes (min):  na 1:1 Treatment Time:  na     TREATMENT AREA =  Bilateral knee pain [M25.561, M25.562]    SUBJECTIVE  Pain Level (on 0 to 10 scale):  0  / 10   Medication Changes/New allergies or changes in medical history, any new surgeries or procedures?    no  If yes, update Summary List   Subjective Functional Status/Changes:  []  No changes reported     i am still recovering from building the deck and cleaning up the yard over the weekend. It makes me realize that i am not ready to do all the things that i want to do .      OBJECTIVE  Modalities Rationale:     decrease inflammation, decrease pain and increase tissue extensibility to improve patient's ability to perform adls   min [] Estim, type/location:                                      []  att     []  unatt     []  w/US     []  w/ice    []  w/heat    min []  Mechanical Traction: type/lbs                   []  pro   []  sup   []  int   []  cont    []  before manual    []  after manual    min []  Ultrasound, settings/location:      min []  Iontophoresis w/ dexamethasone, location:                                               []  take home patch       []  in clinic   10 min [x]  Ice     []  Heat    location/position: Supine with bolster    min []  Vasopneumatic Device, press/temp:     min []  Other:    [x] Skin assessment post-treatment (if applicable):    [x]  intact    []  redness- no adverse reaction     []redness  adverse reaction:        40 min Therapeutic Exercise:  [x]  See flow sheet   Rationale:      increase ROM, increase strength, improve coordination, improve balance and increase proprioception to improve the patients ability to complete adls     20 min Manual Therapy: B knee prom, pat mobs all directions, hs, gastroc and rf  stretch/dtm B   Rationale:      decrease pain, increase ROM, increase tissue extensibility and decrease trigger points to improve patient's ability to stand/walk prolonged periods     min Patient Education:  yes  Reviewed HEP   []  Progressed/Changed HEP based on: Other Objective/Functional Measures:  Palpable trp throughout R HS and RF   arom limited 0-103 on R  Able to complete te as per flow sheet with consistent cues to ambulate with flexed knee   Post Treatment Pain Level (on 0 to 10) scale:   0  / 10     ASSESSMENT  Assessment/Changes in Function:   Continues with R stiff legged gait pattern and limited R knee flexion compared to L      []  See Progress Note/Recertification   Patient will continue to benefit from skilled PT services to modify and progress therapeutic interventions, address functional mobility deficits, address ROM deficits, address strength deficits, analyze and address soft tissue restrictions, analyze and cue movement patterns, analyze and modify body mechanics/ergonomics, assess and modify postural abnormalities and instruct in home and community integration to attain remaining goals. Progress toward goals / Updated goals:    Pt to see MD on 5/1/17 - advised to continue with therapy an additional 2x/4weeks to progress R knee rom, functional mobility and transfers.  Will perform functional scales next session for most updated PN      PLAN  []  Upgrade activities as tolerated yes Continue plan of care   []  Discharge due to :    []  Other:      Therapist: Sd Austin PT    Date: 4/26/2017 Time: 9:57 AM     Future Appointments  Date Time Provider Hansa Brooks   4/28/2017 8:00 AM Edmar Harris PT [facility]

## 2017-04-28 ENCOUNTER — HOSPITAL ENCOUNTER (OUTPATIENT)
Dept: PHYSICAL THERAPY | Age: 67
Discharge: HOME | End: 2017-04-28
Payer: COMMERCIAL

## 2017-04-28 PROCEDURE — 97110 THERAPEUTIC EXERCISES: CPT

## 2017-04-28 PROCEDURE — 97140 MANUAL THERAPY 1/> REGIONS: CPT

## 2017-04-28 NOTE — PROGRESS NOTES
[address] PHYSICAL THERAPY   [facility], [place] [address],[facility], [facility] - Phone: [phone]  Fax: [phone]  PROGRESS NOTE  Patient Name: Lorenza Miramontes : 1950   Treatment/Medical Diagnosis: Bilateral knee pain [M25.561, M25.562]   Referral Source: DANNY Savage     Date of Initial Visit: 3/29/17 Attended Visits: 13 Missed Visits:      SUMMARY OF TREATMENT  Therapeutic ex including strengthening, ROM, flexibility, stabilization, manual therapy including: Patient education, HEP  CURRENT STATUS  Pt is making steady progress in therapy. Pain is rated as 0/10, demonstrates PROM/AROM (R) Knee 0-100 deg, (L) knee 0-115 deg. Con't with tenderness to () suprapatellar region with decrease inferior patellar glides and limited knee flexion on (R). Con't to require verbal cues for knee flexion . Score on the FOTO has improved from 59 at IE to 71. GROC has improved to 5+. Pt has been performing work related activities () along with recently building a deck/installing fencing with increase in (B) knee pain    Goal/Measure of Progress Goal Met?   1.  1. Increase score on FOTO by > or = to 14 points to demo an increase in functional activity tolerance with the LE. Status at last Eval: 66 Current Status: 71 progressing   2.  2. Pt will note < or = 2/10 pain with all mobility to improve comfort with ADLs. Status at last Eval: 0-6/10 Current Status: 0-2/10 yes   3.  3. Pt will demonstrates GROC >/= 4+ to allow increase in functional activity tolerance    Status at last Eval: na Current Status: 5+ yes     New Goals to be achieved in __3-4__  weeks:  1. Pt will demonstrates 0-120 deg AROM of (R) knee to allow increase in ADL tolerance    2. Con't LTG 1    3.      RECOMMENDATIONS  Recommend con't PT services 2-3x week for 4 weeks to allow increase in functional activities   If you have any questions/comments please contact us directly at  [phone]. Thank you for allowing us to assist in the care of your patient. Therapist Signature: SRINIVASAN Miranda DPT Date: 4/28/2017     Time: 8:06 AM   NOTE TO PHYSICIAN:  PLEASE COMPLETE THE ORDERS BELOW AND FAX TO   [facility]: [phone]  If you are unable to process this request in 24 hours please contact our office: [phone]    ___ I have read the above report and request that my patient continue as recommended.   ___ I have read the above report and request that my patient continue therapy with the following changes/special instructions:_________________________________________________________   ___ I have read the above report and request that my patient be discharged from therapy.      Physician Signature:        Date:       Time:

## 2017-04-28 NOTE — PROGRESS NOTES
PHYSICAL THERAPY - DAILY TREATMENT NOTE    Patient Name: Stephani Dunne        Date: 2017  : 1950   YES Patient  Verified  Visit #:   15   of   20  Insurance: Payor: Mindi Jimenez / Plan: Catia Hurd PPO / Product Type: PPO /      In time: 800 Out time: 855   Total Treatment Time: 55       TREATMENT AREA = Bilateral knee pain [M25.561, M25.562]    SUBJECTIVE  Pain Level (on 0 to 10 scale):  0  / 10   Medication Changes/New allergies or changes in medical history, any new surgeries or procedures?     NO    If yes, update Summary List   Subjective Functional Status/Changes:  []  No changes reported     Reports con't to be recovering for last weekends events           OBJECTIVE  Modalities Rationale:     decrease inflammation and decrease pain to improve patient's ability to increase amb tolerance    min [] Estim, type/location:                                      []  att     []  unatt     []  w/US     []  w/ice    []  w/heat    min []  Mechanical Traction: type/lbs                   []  pro   []  sup   []  int   []  cont    []  before manual    []  after manual    min []  Ultrasound, settings/location:      min []  Iontophoresis w/ dexamethasone, location:                                               []  take home patch       []  in clinic    min []  Ice     []  Heat    location/position:     min []  Vasopneumatic Device, press/temp:     min []  Other:    [x] Skin assessment post-treatment (if applicable):    [x]  intact    []  redness- no adverse reaction     []redness  adverse reaction:        35 min Therapeutic Exercise:  [x]  See flow sheet   Rationale:      increase ROM and increase strength to improve the patients ability to perform ADLs     10 Min Manual Therapy: DTM to (R) suprapatellar region, pat mobs, PROM of (R) knee    Rationale:      decrease pain, increase ROM and increase tissue extensibility to improve patient's ability to perform ADLs     min Patient Education:  YES  Reviewed HEP   [] Progressed/Changed HEP based on: Other Objective/Functional Measures:    Demonstrates soreness in (R) suprapatellar region     Post Treatment Pain Level (on 0 to 10) scale:   0  / 10     ASSESSMENT  Assessment/Changes in Function:     See MD progress note     []  See Progress Note/Recertification   Patient will continue to benefit from skilled PT services to modify and progress therapeutic interventions, address functional mobility deficits, address ROM deficits, address strength deficits and analyze and address soft tissue restrictions to attain remaining goals.    Progress toward goals / Updated goals:    See Progress note      PLAN  []  Upgrade activities as tolerated YES Continue plan of care   []  Discharge due to :    []  Other:      Therapist: Sukhdeep Foley DPT, [facility]    Date: 4/28/2017 Time: 8:05 AM       Future Appointments  Date Time Provider Hansa Brooks   5/3/2017 8:00 AM Natasha López, PT [facility]   5/5/2017 7:30 AM Danny Almonte, PT [facility]   5/8/2017 8:00 AM Danny Almonte, PT [facility]   5/11/2017 8:00 AM Danny Almonte, PT [facility]   5/15/2017 8:00 AM Natasha López, PT [facility]   5/18/2017 8:00 AM Danny Almonte, PT [facility]   5/22/2017 8:00 AM Danny Almonte, PT [facility]   5/25/2017 8:00 AM Danny Almonte, PT [facility]

## 2017-05-03 ENCOUNTER — HOSPITAL ENCOUNTER (OUTPATIENT)
Dept: PHYSICAL THERAPY | Age: 67
Discharge: HOME | End: 2017-05-03
Payer: COMMERCIAL

## 2017-05-03 PROCEDURE — 97140 MANUAL THERAPY 1/> REGIONS: CPT | Performed by: PHYSICAL THERAPIST

## 2017-05-03 PROCEDURE — 97110 THERAPEUTIC EXERCISES: CPT | Performed by: PHYSICAL THERAPIST

## 2017-05-03 NOTE — PROGRESS NOTES
[facility] PHYSICAL THERAPY - DAILY TREATMENT NOTE    Patient Name: Alisha Ramos        Date: 5/3/2017  : 1950   yes Patient  Verified  Visit #:   15   of   20  Insurance: Payor: Shimon Primes / Plan: Wolfgang Ashton PPO / Product Type: PPO /      In time: 8:00 Out time: 9:12   Total Treatment Time: 70     Medicare Time Tracking (below)   Total Timed Codes (min):  na 1:1 Treatment Time:  na     TREATMENT AREA =  Bilateral knee pain [M25.561, M25.562]    SUBJECTIVE  Pain Level (on 0 to 10 scale):  0  / 10   Medication Changes/New allergies or changes in medical history, any new surgeries or procedures? yes  If yes, update Summary List   Subjective Functional Status/Changes:  []  No changes reported     i saw the doctor on Monday. He put me on a prednisone pack and i think i can tell a difference already.       OBJECTIVE  Modalities Rationale:     decrease inflammation, decrease pain and increase tissue extensibility to improve patient's ability to perform adls   min [] Estim, type/location:                                      []  att     []  unatt     []  w/US     []  w/ice    []  w/heat    min []  Mechanical Traction: type/lbs                   []  pro   []  sup   []  int   []  cont    []  before manual    []  after manual    min []  Ultrasound, settings/location:      min []  Iontophoresis w/ dexamethasone, location:                                               []  take home patch       []  in clinic   10 min [x]  Ice     []  Heat    location/position: Supine with bolster    min []  Vasopneumatic Device, press/temp:     min []  Other:    [x] Skin assessment post-treatment (if applicable):    [x]  intact    []  redness- no adverse reaction     []redness  adverse reaction:        40 min Therapeutic Exercise:  [x]  See flow sheet   Rationale:      increase ROM, increase strength, improve coordination, improve balance and increase proprioception to improve the patients ability to complete adls     20 min Manual Therapy: B knee prom, pat mobs all directions, hs, gastroc, add and rf  stretch/dtm B, prone quad stretch   Rationale:      decrease pain, increase ROM, increase tissue extensibility and decrease trigger points to improve patient's ability to stand/walk prolonged periods     min Patient Education:  yes  Reviewed HEP   []  Progressed/Changed HEP based on: Other Objective/Functional Measures:  Completed te as per flow sheet with vcs needed for appropriate hip hinge and/or avoiding hip circumduction - once cued able to achieved   Post Treatment Pain Level (on 0 to 10) scale:   0  / 10     ASSESSMENT  Assessment/Changes in Function:   Continues with R stiff legged gait pattern and limited R knee flexion compared to L however he was able to achieve end range with decreased warm up time and improved end feel     []  See Progress Note/Recertification   Patient will continue to benefit from skilled PT services to modify and progress therapeutic interventions, address functional mobility deficits, address ROM deficits, address strength deficits, analyze and address soft tissue restrictions, analyze and cue movement patterns, analyze and modify body mechanics/ergonomics, assess and modify postural abnormalities and instruct in home and community integration to attain remaining goals.    Progress toward goals / Updated goals:    Pt to continue with therapy an additional month in order to progress functional mobility and strength     PLAN  []  Upgrade activities as tolerated yes Continue plan of care   []  Discharge due to :    []  Other:      Therapist: Caitlin Jerome PT    Date: 5/3/2017 Time: 9:57 AM     Future Appointments  Date Time Provider Hansa Brooks   5/5/2017 7:30 AM Sebas Garcia PT [facility]   5/8/2017 8:00 AM Sebas Garcia PT [facility]   5/11/2017 8:00 AM Sebas Garcia PT [facility]   5/15/2017 8:00 AM Caitlin Jerome PT [facility]   5/18/2017 8:00 AM Sebas Garcia PT [facility]   5/22/2017 8:00 AM Andres Madeleine Whatley [facility]   5/25/2017 8:00 AM Sebas Garcia, PT [facility]

## 2017-05-05 ENCOUNTER — HOSPITAL ENCOUNTER (OUTPATIENT)
Dept: PHYSICAL THERAPY | Age: 67
Discharge: HOME | End: 2017-05-05
Payer: COMMERCIAL

## 2017-05-05 PROCEDURE — 97110 THERAPEUTIC EXERCISES: CPT

## 2017-05-05 PROCEDURE — 97140 MANUAL THERAPY 1/> REGIONS: CPT

## 2017-05-08 ENCOUNTER — HOSPITAL ENCOUNTER (OUTPATIENT)
Dept: PHYSICAL THERAPY | Age: 67
Discharge: HOME | End: 2017-05-08
Payer: COMMERCIAL

## 2017-05-08 PROCEDURE — 97140 MANUAL THERAPY 1/> REGIONS: CPT

## 2017-05-08 PROCEDURE — 97110 THERAPEUTIC EXERCISES: CPT

## 2017-05-08 NOTE — PROGRESS NOTES
PHYSICAL THERAPY - DAILY TREATMENT NOTE    Patient Name: Monica Yao        Date: 2017  : 1950   YES Patient  Verified  Visit #:     Insurance: Payor: Zoltan Alvarez / Plan: Vick Montenegro PPO / Product Type: PPO /      In time: 8:00 Out time: 8:55   Total Treatment Time: 55     Medicare Time Tracking (below)   Total Timed Codes (min):  na 1:1 Treatment Time:  na     TREATMENT AREA =  Bilateral knee pain [M25.561, M25.562]    SUBJECTIVE  Pain Level (on 0 to 10 scale):  0  / 10   Medication Changes/New allergies or changes in medical history, any new surgeries or procedures? NO    If yes, update Summary List   Subjective Functional Status/Changes:  []  No changes reported     No new c/o          OBJECTIVE  40 min Therapeutic Exercise: [x] See flow sheet   Rationale: increase ROM, increase strength and improve coordination to improve the patients ability to amb      15 min Manual Therapy: DTM TFL, RF, Add, BF, Pat/TF mob PROM   Rationale: decrease pain, increase ROM and increase tissue extensibility to improve patient's ability to amb     min Patient Education:  YES  Reviewed HEP   []  Progressed/Changed HEP based on: Other Objective/Functional Measures:    Cont to have sig TTP at R BF     Post Treatment Pain Level (on 0 to 10) scale:   0  / 10     ASSESSMENT  Assessment/Changes in Function:     Good shannan to all Rx without increase in pain      []  See Progress Note/Recertification   Patient will continue to benefit from skilled PT services to modify and progress therapeutic interventions, address functional mobility deficits, address ROM deficits, address strength deficits, analyze and address soft tissue restrictions, analyze and cue movement patterns, analyze and modify body mechanics/ergonomics and assess and modify postural abnormalities to attain remaining goals.    Progress toward goals / Updated goals:    Slowly progressing with ROM      PLAN  []  Upgrade activities as tolerated YES Continue plan of care   []  Discharge due to :    []  Other:      Therapist: Delia Aldridge, PT, OCS, SCS, CSCS    Date: 5/8/2017 Time: 6:51 AM       Future Appointments  Date Time Provider Hansa Brooks   5/8/2017 8:00 AM Christian Bloom, PT [facility]   5/11/2017 8:00 AM Christian Bloom, PT [facility]   5/15/2017 8:00 AM Kimberly Duarte, PT [facility]   5/18/2017 8:00 AM Christian Bloom, PT [facility]   5/22/2017 8:00 AM Christian Bloom, PT [facility]   5/25/2017 8:00 AM Christian Bloom, PT [facility]

## 2017-05-11 ENCOUNTER — HOSPITAL ENCOUNTER (OUTPATIENT)
Dept: PHYSICAL THERAPY | Age: 67
Discharge: HOME | End: 2017-05-11
Payer: COMMERCIAL

## 2017-05-11 PROCEDURE — 97110 THERAPEUTIC EXERCISES: CPT

## 2017-05-11 PROCEDURE — 97140 MANUAL THERAPY 1/> REGIONS: CPT

## 2017-05-11 NOTE — PROGRESS NOTES
PHYSICAL THERAPY - DAILY TREATMENT NOTE    Patient Name: Cesar Cline        Date: 2017  : 1950   YES Patient  Verified  Visit #:     Insurance: Payor: Sidney Devlin / Plan: Rusty Estes PPO / Product Type: PPO /      In time: 8:05 Out time: 8:55   Total Treatment Time: 50     Medicare Time Tracking (below)   Total Timed Codes (min):  na 1:1 Treatment Time:  na     TREATMENT AREA =  Bilateral knee pain [M25.561, M25.562]    SUBJECTIVE  Pain Level (on 0 to 10 scale):  0  / 10   Medication Changes/New allergies or changes in medical history, any new surgeries or procedures? NO    If yes, update Summary List   Subjective Functional Status/Changes:  []  No changes reported     No new c/o          OBJECTIVE  40 min Therapeutic Exercise: [x] See flow sheet   Rationale: increase ROM, increase strength and improve coordination to improve the patients ability to amb       10 min Manual Therapy: DTM TFL, RF, Add, BF, Pat/TF mob PROM   Rationale: decrease pain, increase ROM and increase tissue extensibility to improve patient's ability to amb        min Patient Education:  YES  Reviewed HEP   []  Progressed/Changed HEP based on: Other Objective/Functional Measures:    Cont to have sig TTP at R TFL     Post Treatment Pain Level (on 0 to 10) scale:   0  / 10     ASSESSMENT  Assessment/Changes in Function:     Good shannan to all Rx without increase in pain      []  See Progress Note/Recertification   Patient will continue to benefit from skilled PT services to modify and progress therapeutic interventions, address functional mobility deficits, address ROM deficits, address strength deficits, analyze and address soft tissue restrictions, analyze and cue movement patterns, analyze and modify body mechanics/ergonomics and assess and modify postural abnormalities to attain remaining goals.    Progress toward goals / Updated goals:    Slow progress with pain reduction      PLAN  []  Upgrade activities as tolerated YES Continue plan of care   []  Discharge due to :    []  Other:      Therapist: Paulette Houston, PT, OCS, SCS, CSCS    Date: 5/11/2017 Time: 6:40 AM       Future Appointments  Date Time Provider Hansa Brooks   5/11/2017 8:00 AM [facility]   5/15/2017 8:00 AM [facility]ates[facility]   5/18/2017 8:00 AM [facility]   5/22/2017 8:00 AM [facility]   5/25/2017 8:00 AM D[facility]

## 2017-05-15 ENCOUNTER — HOSPITAL ENCOUNTER (OUTPATIENT)
Dept: PHYSICAL THERAPY | Age: 67
Discharge: HOME | End: 2017-05-15
Payer: COMMERCIAL

## 2017-05-15 PROCEDURE — 97140 MANUAL THERAPY 1/> REGIONS: CPT | Performed by: PHYSICAL THERAPIST

## 2017-05-15 PROCEDURE — 97110 THERAPEUTIC EXERCISES: CPT | Performed by: PHYSICAL THERAPIST

## 2017-05-15 NOTE — PROGRESS NOTES
[facility] PHYSICAL THERAPY - DAILY TREATMENT NOTE    Patient Name: Cesar Cline        Date: 5/15/2017  : 1950   yes Patient  Verified  Visit #:     Insurance: Payor: Sidney Devlin / Plan: Rusty Estes PPO / Product Type: PPO /      In time: 8:02 Out time: 8:52   Total Treatment Time: 50     Medicare Time Tracking (below)   Total Timed Codes (min):  na 1:1 Treatment Time:  na     TREATMENT AREA =  Bilateral knee pain [M25.561, M25.562]    SUBJECTIVE  Pain Level (on 0 to 10 scale):  0  / 10   Medication Changes/New allergies or changes in medical history, any new surgeries or procedures? yes  If yes, update Summary List   Subjective Functional Status/Changes:  []  No changes reported     i just get stiff in the morning that is it.      OBJECTIVE  Modalities Rationale:     pt denied to improve patient's ability to perform adls   min [] Estim, type/location:                                      []  att     []  unatt     []  w/US     []  w/ice    []  w/heat    min []  Mechanical Traction: type/lbs                   []  pro   []  sup   []  int   []  cont    []  before manual    []  after manual    min []  Ultrasound, settings/location:      min []  Iontophoresis w/ dexamethasone, location:                                               []  take home patch       []  in clinic    min []  Ice     []  Heat    location/position:     min []  Vasopneumatic Device, press/temp:     min []  Other:    [] Skin assessment post-treatment (if applicable):    []  intact    []  redness- no adverse reaction     []redness  adverse reaction:        30 min Therapeutic Exercise:  [x]  See flow sheet   Rationale:      increase ROM, increase strength, improve coordination, improve balance and increase proprioception to improve the patients ability to complete adls     20 min Manual Therapy: B knee prom, pat mobs all directions, B hs and prone quad stretch, dtm L HS and R quad   Rationale:      decrease pain, increase ROM, increase tissue extensibility and decrease trigger points to improve patient's ability to stand/walk prolonged periods     min Patient Education:  yes  Reviewed HEP   []  Progressed/Changed HEP based on: Other Objective/Functional Measures:  R knee arom flex still limited compared to L and continues with flexed gait pattern with slight trendelenburg B       Post Treatment Pain Level (on 0 to 10) scale:   0  / 10     ASSESSMENT  Assessment/Changes in Function:   Functional improvement: increased ability to squat  Functional limitation: decreased wb duration due to fatigue     []  See Progress Note/Recertification   Patient will continue to benefit from skilled PT services to modify and progress therapeutic interventions, address functional mobility deficits, address ROM deficits, address strength deficits, analyze and address soft tissue restrictions, analyze and cue movement patterns, analyze and modify body mechanics/ergonomics, assess and modify postural abnormalities and instruct in home and community integration to attain remaining goals. Progress toward goals / Updated goals:    Approaching discharge in 1-3 sessions due to significant progress towards established goals.       PLAN  []  Upgrade activities as tolerated yes Continue plan of care   []  Discharge due to :    []  Other:      Therapist: Caitlin Jerome PT    Date: 5/15/2017 Time: 9:57 AM     Future Appointments  Date Time Provider Hansa Brooks   5/18/2017 8:00 AM Sebas Garcia PT [facility]   5/22/2017 8:00 AM Sebas Garcia PT [facility]   5/25/2017 8:00 AM Sebas Garcia PT [facility]

## 2017-05-18 ENCOUNTER — HOSPITAL ENCOUNTER (OUTPATIENT)
Dept: PHYSICAL THERAPY | Age: 67
Discharge: HOME | End: 2017-05-18
Payer: COMMERCIAL

## 2017-05-18 PROCEDURE — 97110 THERAPEUTIC EXERCISES: CPT

## 2017-05-18 PROCEDURE — 97140 MANUAL THERAPY 1/> REGIONS: CPT

## 2017-05-18 NOTE — PROGRESS NOTES
PHYSICAL THERAPY - DAILY TREATMENT NOTE    Patient Name: Aaron Mejias        Date: 2017  : 1950   YES Patient  Verified  Visit #:     Insurance: Payor: Mounika Augustine / Plan: Matteo Arellano PPO / Product Type: PPO /      In time: 8:05 Out time: 8:55   Total Treatment Time: 50     Medicare Time Tracking (below)   Total Timed Codes (min):  na 1:1 Treatment Time:  na     TREATMENT AREA =  Bilateral knee pain [M25.561, M25.562]    SUBJECTIVE  Pain Level (on 0 to 10 scale):  0  / 10   Medication Changes/New allergies or changes in medical history, any new surgeries or procedures? NO    If yes, update Summary List   Subjective Functional Status/Changes:  []  No changes reported     Doing great          OBJECTIVE    40 min Therapeutic Exercise: [x] See flow sheet   Rationale: increase ROM, increase strength and improve coordination to improve the patients ability to amb       10 min Manual Therapy: DTM TFL, RF, Add, BF, Pat/TF mob PROM   Rationale: decrease pain, increase ROM and increase tissue extensibility to improve patient's ability to amb     min Patient Education:  YES  Reviewed HEP   []  Progressed/Changed HEP based on: Other Objective/Functional Measures:    Cont to have increased soft tissue tension noted at R HS     Post Treatment Pain Level (on 0 to 10) scale:   0  / 10     ASSESSMENT  Assessment/Changes in Function:     Good shannan to all Rx without increase in pain      []  See Progress Note/Recertification   Patient will continue to benefit from skilled PT services to modify and progress therapeutic interventions, address functional mobility deficits, address ROM deficits, address strength deficits, analyze and address soft tissue restrictions, analyze and cue movement patterns, analyze and modify body mechanics/ergonomics and assess and modify postural abnormalities to attain remaining goals. Progress toward goals / Updated goals:    Anticipate DC next visit.       PLAN  []  Upgrade activities as tolerated YES Continue plan of care   []  Discharge due to :    []  Other:      Therapist: Dee Everett, PT, OCS, SCS, CSCS    Date: 5/18/2017 Time: 6:33 AM       Future Appointments  Date Time Provider Hansa Brooks   5/18/2017 8:00 AM Magui Santana PT [facility]   5/22/2017 8:00 AM Magui Santana PT [facility]   5/25/2017 8:00 AM Magui Santana PT [facility]

## 2017-05-22 ENCOUNTER — HOSPITAL ENCOUNTER (OUTPATIENT)
Dept: PHYSICAL THERAPY | Age: 67
Discharge: HOME | End: 2017-05-22
Payer: COMMERCIAL

## 2017-05-22 PROCEDURE — 97110 THERAPEUTIC EXERCISES: CPT

## 2017-05-22 PROCEDURE — 97140 MANUAL THERAPY 1/> REGIONS: CPT

## 2017-05-22 NOTE — PROGRESS NOTES
PHYSICAL THERAPY - DAILY TREATMENT NOTE    Patient Name: Vanessa Cruz        Date: 2017  : 1950   YES Patient  Verified  Visit #:     Insurance: Payor: Cam Ma / Plan: Jesus Jones PPO / Product Type: PPO /      In time: 8:00 Out time: 8:50   Total Treatment Time: 50     Medicare Time Tracking (below)   Total Timed Codes (min):  na 1:1 Treatment Time:  na     TREATMENT AREA =  Bilateral knee pain [M25.561, M25.562]    SUBJECTIVE  Pain Level (on 0 to 10 scale):  2  / 10   Medication Changes/New allergies or changes in medical history, any new surgeries or procedures? NO    If yes, update Summary List   Subjective Functional Status/Changes:  []  No changes reported     I was working on an engine and i was in a twisted position and it hurts a bit          OBJECTIVE  40 min Therapeutic Exercise: [x] See flow sheet   Rationale: increase ROM, increase strength and improve coordination to improve the patients ability to amb       10 min Manual Therapy: DTM TFL, RF, Add, BF, tib IR mob PROM   Rationale: decrease pain, increase ROM and increase tissue extensibility to improve patient's ability to amb     min Patient Education:  YES  Reviewed HEP   []  Progressed/Changed HEP based on:         Other Objective/Functional Measures:    FOTO = 70  Cont to lack end range ext   Post Treatment Pain Level (on 0 to 10) scale:   0  / 10     ASSESSMENT  Assessment/Changes in Function:     Independent with all Ex     []  See Progress Note/Recertification      Progress toward goals / Updated goals:    See DC     PLAN  []  Upgrade activities as tolerated  Continue plan of care   [x]  Discharge due to : Met goals   []  Other:      Therapist: Pia Nguyen, PT, OCS, SCS, CSCS    Date: 2017 Time: 6:33 AM       Future Appointments  Date Time Provider Hansa Brooks   2017 8:00 AM Angela Ross PT [facility]   2017 8:00 AM Angela Ross PT [facility]

## 2017-05-23 NOTE — PROGRESS NOTES
[address] PHYSICAL THERAPY   [facility], [address],[facility], [facility] - Phone: [phone]  Fax: [phone]  DISCHARGE SUMMARY  Patient Name: Cesar Cline : 1950   Treatment/Medical Diagnosis: Bilateral knee pain [M25.561, M25.562]   Referral Source: [place], [place]     Date of Initial Visit: 3/29/17 Attended Visits: 20 Missed Visits: 0     SUMMARY OF TREATMENT  Cesar Cline has been seen at our clinic 2-3x/wk for a total of 20 visits. Pt treatment has consisted of therapeutic exercise for knee ROM, LE strengthening, hip/core strengthening, and manual therapy (jt mobilization and deep tissue mobilization at hamstring, quad and glute)  CURRENT STATUS  Pt has had a good tolerance to physical therapy treatment. He reports no difficulty with ambulation and stair negotiations. He continues to complain of difficulty and increase pain with twisting motion. However, we believe that he will be able to continue to progress with his pain reduction and function independently at this point. Goal/Measure of Progress Goal Met? 1. Pt will demonstrates 0-120 deg AROM of (R) knee to allow increase in ADL tolerance    Status at last Eval: R = 0-100, L =0-115 Current Status: na n/a   2. Increase FOTO score by 14 % show functional improvement   Status at last Eval: 66% Current Status: 71% progressing       RECOMMENDATIONS  Discharge from physical therapy treatment with [facility]. Specifics:  If you have any questions/comments please contact us directly at [phone]. Thank you for allowing us to assist in the care of your patient.     Therapist Signature: Amadou Mora DPT, OCS, SCS, CSCS Date: 2017     Time: 6:27 PM

## 2017-05-25 ENCOUNTER — APPOINTMENT (OUTPATIENT)
Dept: PHYSICAL THERAPY | Age: 67
End: 2017-05-25
Payer: COMMERCIAL